# Patient Record
Sex: MALE | Race: WHITE | URBAN - METROPOLITAN AREA
[De-identification: names, ages, dates, MRNs, and addresses within clinical notes are randomized per-mention and may not be internally consistent; named-entity substitution may affect disease eponyms.]

---

## 2019-01-07 ENCOUNTER — OFFICE VISIT (OUTPATIENT)
Dept: ORTHOPEDICS | Facility: CLINIC | Age: 24
End: 2019-01-07
Payer: COMMERCIAL

## 2019-01-07 ENCOUNTER — ANCILLARY PROCEDURE (OUTPATIENT)
Dept: GENERAL RADIOLOGY | Facility: CLINIC | Age: 24
End: 2019-01-07
Payer: COMMERCIAL

## 2019-01-07 VITALS — RESPIRATION RATE: 16 BRPM | BODY MASS INDEX: 26.96 KG/M2 | HEIGHT: 74 IN

## 2019-01-07 DIAGNOSIS — M25.562 ACUTE PAIN OF LEFT KNEE: Primary | ICD-10-CM

## 2019-01-07 NOTE — PROGRESS NOTES
[facility]  Orthopedics  Irineo Vera MD  2019     Name: Buddy Feliz  MRN: [id number]  Age: 23 year old  : 1995  Referring provider: Referred Self     Chief Complaint: Left knee pain    Date of Injury: ***    History of Present Illness:   Buddy Feliz is a 23 year old, male who presents today for evaluation of his left knee pain beginning one week ago after falling down stairs and catching himself.     Review of Systems:   A 10-point review of systems was obtained and is negative except for as noted in the HPI.     Medications:   Current Outpatient Medications:      diazepam (VALIUM) 5 MG tablet, Take 1 tablet (5 mg) by mouth once as needed for anxiety, Disp: 2 tablet, Rfl: 0    Allergies:  Penicillins (rash)    Past Medical History:  The patient denies any significant past medical history.    Past Surgical History:  The patient does not have any pertinent past surgical history.    Social History:  The patient is a medical device rep.     Family History:  No past pertinent family history.    Physical Examination:  There were no vitals taken for this visit.    Imaging:   XR left knee - 3 views (19)  ***    I have independently reviewed the above imaging studies; the results were discussed with the patient.     Assessment:   23 year old, male with ***    Plan:   ***    Scribe Disclosure:   I, Nhan Shaffer, am serving as a scribe to document services personally performed by Irineo Vera MD at this visit, based upon the provider's statements to me. All documentation has been reviewed by the aforementioned provider prior to being entered into the official medical record.

## 2019-01-07 NOTE — PROGRESS NOTES
SPORTS & ORTHOPEDIC WALK-IN VISIT 1/7/2019    Primary Care Physician:      Patient here today with for the left knee pain.  Slipped on the stairs last week and caught himself.  Did not strike knee but since that time has had pain in the superior patella particularly on the medial side.  Pain is worse with walking up and down stairs.  Also has some pain with prolonged positioning and bent position.  Does have some clicking but no locking or catching.  Has not noted any significant swelling.  No prior history of significant knee injury or trauma.      Reason for visit:     What part of your body is injured / painful?  left knee    What caused the injury /pain? Unsure    How long ago did your injury occur or pain begin? a week ago    What are your most bothersome symptoms? Pain, Swelling and Other: catching with extension, not painful     How would you characterize your symptom?  aching, shooting and throbbing    What makes your symptoms better? Nothing    What makes your symptoms worse? Walking, Sitting and Other: stairs    Have you been previously seen for this problem? No    Medical History:    Any recent changes to your medical history? No    Any new medication prescribed since last visit? No    Have you had surgery on this body part before? No    Social History:    Occupation: Medical device rep    Handedness: Right    Exercise: 4-5 days/week    Review of Systems:    Do you have fever, chills, weight loss? No    Do you have any vision problems? No    Do you have any chest pain or edema? No    Do you have any shortness of breath or wheezing?  No    Do you have stomach problems? No    Do you have any numbness or focal weakness? No    Do you have diabetes? No    Do you have problems with bleeding or clotting? No    Do you have an rashes or other skin lesions? No         Past Medical History, Current Medications, and Allergies are reviewed in the electronic medical record as appropriate.       EXAM:Resp 16    "Ht 1.88 m (6' 2\")   BMI 26.96 kg/m      Patient is alert, No acute distress, pleasant and conversational.    Gait: nonantalgic. Normal heel toe gait.    Patient is able to perform two legged squat with some pain standing from the bottom of the squat.    left knee:   Skin intact. No erythema or ecchymosis.  No effusion or soft tissue swelling.    AROM: Zero to approximately 135  with some pain and forceful flexion.    Palpation:  Point tenderness over the superior pole of the patella particularly on the medial side.  No TTP of the quadriceps tendon or patellar tendon.   No lateral facet joint tenderness.  No posterior medial or posterior lateral joint line tenderness     Special Tests:  Negative bounce test, negative forced flexion and negative Pee's.  No ligamentous laxity or pain with valgus or varus stress.  Negative Lachman's, Anterior Drawer and Posterior Drawer     Full Isometric quad strength, extensor mechanism in place     Neurovascularly intact in the lower extremity    Hip and Ankle with full AROM and nontender      Imaging: xrays of left knee performed and reviewed independently demonstrating no acute fracture dislocation.  No significant degenerative disease. See EMR for formal radiology report.     Assessment: Patient is a 23 year old male with left knee pain consistent with likely quadriceps tendon injury as his tenderness seems to be localized to a small portion of the insertion on the superior pole of the patella.    Recommendations:   Reviewed imaging and assessment with the patient in detail.  Given home exercises.  Patient will call if he would like formal referral to physical therapy  Okay to use NSAIDs or acetaminophen as needed for pain  Recommended icing after activities  Avoid aggravating activities until pain-free, which may include any deep knee flexion exercises such as squats or lunges.  Follow-up in 4-6 weeks if there is still persistent pain.  Sooner if new or worsening " symptoms.    Irineo Vera MD

## 2020-03-10 ENCOUNTER — HEALTH MAINTENANCE LETTER (OUTPATIENT)
Age: 25
End: 2020-03-10